# Patient Record
Sex: MALE
[De-identification: names, ages, dates, MRNs, and addresses within clinical notes are randomized per-mention and may not be internally consistent; named-entity substitution may affect disease eponyms.]

---

## 2017-12-28 ENCOUNTER — HOSPITAL ENCOUNTER (OUTPATIENT)
Dept: HOSPITAL 5 - OR | Age: 67
Discharge: HOME | End: 2017-12-28
Attending: OPHTHALMOLOGY
Payer: MEDICARE

## 2017-12-28 VITALS — SYSTOLIC BLOOD PRESSURE: 140 MMHG | DIASTOLIC BLOOD PRESSURE: 60 MMHG

## 2017-12-28 DIAGNOSIS — H11.001: Primary | ICD-10-CM

## 2017-12-28 PROCEDURE — V2790 AMNIOTIC MEMBRANE: HCPCS

## 2017-12-28 PROCEDURE — 88304 TISSUE EXAM BY PATHOLOGIST: CPT

## 2017-12-28 PROCEDURE — C9250 ARTISS FIBRIN SEALANT: HCPCS

## 2017-12-28 PROCEDURE — 65426 REMOVAL OF EYE LESION: CPT

## 2017-12-28 RX ADMIN — MOXIFLOXACIN SCH DROPS: 5 SOLUTION/ DROPS OPHTHALMIC at 12:35

## 2017-12-28 RX ADMIN — MOXIFLOXACIN SCH DROPS: 5 SOLUTION/ DROPS OPHTHALMIC at 12:42

## 2017-12-28 RX ADMIN — MOXIFLOXACIN SCH DROPS: 5 SOLUTION/ DROPS OPHTHALMIC at 12:29

## 2017-12-28 NOTE — OPERATIVE REPORT
Operative Report


Operative Report: 








PREOPERATIVE DIAGNOSIS: Pterygium with visual distortion, _right_eye





POSTOPERATIVE DIAGNOSIS: Pterygium with visual distortion, right eye





OPERATIVE PROCEDURE: Excision of pterygium with mitomycin C and amniotic graft  

membrane right eye





SURGEON: Anika Neal M.D.





ASSISTANT SURGEON: none





ANESTHESIA: Monitored anesthesia care





ANESTHETIST:





COMPLICATIONS: None





ALLERGIES: Chloroquine





PREOPERATIVE NOTE: The risks, benefits and alternatives of surgery were 

explained to the patient who after  confirmining understanding elected to 

proceed with surgery. The risks discussed included but were not limited to 

infection, further surgery, loss of vision, loss of the eye. The patient had 

multiple opportunities to ask questions and have them answered. Preoperative 

instruction sheet was provided and explained to the patient and/or family.





PROGNOSIS: Excellent





INDICATIONS FOR SURGERY: Distortion of vision from the lesion. Without treatment

, permanent visual loss is expected.





OPERATIVE REPORT: The patient was taken into the preoperative area and then 

sedated and monitored by Anesthesia. The patient was prepped by applying a 

Betadine scrub to the periorbital area, the adjacent cheek, and the forehead. 

The prepped areas were dried with sterile gauze. The patient was draped, and a 

speculum was placed between the eyelids.





2% lidocaine was injected below the body of the pterygium. A cut-down was made 

through the body of the pterygium to bare sclera. The dissection was then 

carried towards the limbus, elevating up the pterygium. Moderate bleeding was 

encountered and treated with cautery. Once the dissection was taken to the 

limbus, the head of the  pterygium was dissected off the cornea with a [place]oke 

knife. The pterygium was densely scarred into the underlying stroma, making the 

dissection process difficult to perform. A superficial dissection plane was 

made in a few areas.


The mass of fibrous growth was then excised from the limbus.





A sakina bur on a high-speed drill was used to smooth the area of the cornea 

where the pterygium was removed.





This was done in order to leave the tissue smooth and minimize the chance of 

recurrence. A rough limbal surface increases the risks of irritation, 

inflammation, and the possibility of postoperative recurrence in the eye. The 

limbal area was smoothed with the sakina bur, and care was taken not to remove 

too much tissue, leaving the cornea ectatic.





After the scar tissue was removed, Mitomycin-C was placed on bare sclera x 60 

secs on the eye with Weck-jany sponges and immediately irrigated off. The 

irrigation was done liberally to prevent any Mitomycin-C contamination to the 

rest of the field and the eye. The cornea was irrigated with balanced salt 

solution.





Once the pterygium was excised and the cornea smoothed, cautery was used to 

control any bleeding in the bed of bare sclera. The peripheral edges of 

remaining conjunctiva around the bare sclera had its edges undermined slightly 

to allow it to be fixed to the underlying sclera when the tissue adhesive would 

be applied. Calipers were then used to measure the width and length of the area 

of bare sclera. After marking the tips of the calipers, they were used to dawosn 

the amniotic graft. Scissors were next used to first undermine and then excise 

the graft. Fine-tooth forceps were used carefully to elevate the graft and 

moved to the area of the bare sclera. It was moved carefully to make sure that 

first of all the epithelial side remained upward . After the graft was found to 

be suitable for the area to be  covered, it was temporarily moved off the bare 

sclera and Tisseel tissue adhesive was applied in its two components as 

separate stages over the area of bare sclera. The graft was placed back in 

position and its edges were first pushed down 360 to allow firm fixation. The 

central area was also pushed down with firm pressure from a flat surfaced 

instrument. Next the edges of the previously undermined adjacent conjunctiva 

were pushed down to allow firm fixation. The flap was allowed to stay 

unmanipulated for ten minutes prior to removing the lid speculum.





MEDICATIONS APPLIED AT END OF SURGERY: bandage Contact lens was placed onto the 

eye fallowed by the application of Vigamox.





DISCHARGE SUMMARY: The patient was released in stable condition. The patient 

and those with the patient were given a written sheet of postoperative 

instructions and counseling on any abnormal laboratory studies. They are to 

call immediately for difficulties.





_________________________ __________________________

## 2017-12-28 NOTE — SHORT STAY SUMMARY
Short Stay Documentation


Date of service: 12/28/17





- History


H&P: obtained from office





- Allergies and Medications


Current Medications: 


 Allergies





No Known Allergies Allergy (Verified 12/28/17 10:57)


 





 Home Medications











 Medication  Instructions  Recorded  Confirmed  Last Taken  Type


 


Bisoprolol [Zebeta] 1 tab PO BID 12/27/17 12/28/17 12/28/17 06:30 History


 


Diltiazem HCl [Diltiazem HCl] 120 mg PO BID 12/27/17 12/28/17 12/28/17 06:30 

History


 


Doxazosin Mesylate [Cardura] 8 mg PO QHS 12/27/17 12/28/17 12/28/17 06:30 

History


 


Tamsulosin [Flomax] 0.4 mg PO QHS 12/27/17 12/28/17 12/28/17 06:30 History








Active Medications





Moxifloxacin HCl (Vigamox)  1 drops OD Q5MIN SARAY


   Stop: 12/28/17 23:59


   Last Admin: 12/28/17 12:42 Dose:  1 drops


Prednisolone Acetate (Pred Forte 1%)  1 drops OD QID SARAY


Tetracaine HCl (Tetracaine 0.5%)  1 drops OD Q5M PRN


   PRN Reason: Analgesia


   Stop: 12/28/17 23:59


   Last Admin: 12/28/17 12:28 Dose:  1 drops











- Brief post op/procedure progress note


Date of procedure: 12/28/17


Pre-op diagnosis: right pterygium


Post-op diagnosis: same


Procedure: 





Pterygium excision with mitomycin-C and amniotic graft membrane placement right 

eye


Anesthesia: MAC, local


Surgeon: SANDI MICHAEL


Estimated blood loss: minimal


Pathology: list (nasal pterygium)


Condition: stable





- Disposition


Condition at discharge: Good


Disposition: DC-01 TO HOME OR SELFCARE





- Discharge Diagnoses


(1) Pterygium eye


Status: Resolved   


Qualifiers: 


   Laterality: right   Qualified Code(s): H11.001 - Unspecified pterygium of 

right eye   





Short Stay Discharge Plan


Follow up with: 


ERIN WINTERS MD [Primary Care Provider] - 7 Days

## 2022-09-09 NOTE — POST ANESTHESIA EVALUATION
- Post Anesthesia Evaluation


Patient Participated: Yes


Airway Patent: Yes


Stable Respiratory Function: Yes


Temp > 96.8F: Yes


Pain Manageable: Yes


Adequeate Hydration: Yes


Anesthesia Complications: No
58

## 2023-07-27 ENCOUNTER — OFFICE VISIT (OUTPATIENT)
Dept: URBAN - METROPOLITAN AREA CLINIC 70 | Facility: CLINIC | Age: 73
End: 2023-07-27

## 2023-10-23 ENCOUNTER — TELEPHONE ENCOUNTER (OUTPATIENT)
Dept: URBAN - METROPOLITAN AREA CLINIC 118 | Facility: CLINIC | Age: 73
End: 2023-10-23

## 2023-10-23 ENCOUNTER — OFFICE VISIT (OUTPATIENT)
Dept: URBAN - METROPOLITAN AREA CLINIC 118 | Facility: CLINIC | Age: 73
End: 2023-10-23
Payer: MEDICARE

## 2023-10-23 VITALS
DIASTOLIC BLOOD PRESSURE: 88 MMHG | WEIGHT: 210 LBS | HEIGHT: 64 IN | BODY MASS INDEX: 35.85 KG/M2 | SYSTOLIC BLOOD PRESSURE: 129 MMHG | HEART RATE: 103 BPM | TEMPERATURE: 98.8 F

## 2023-10-23 DIAGNOSIS — Z87.19 HISTORY OF GASTRIC POLYP: ICD-10-CM

## 2023-10-23 DIAGNOSIS — Z86.010 HISTORY OF COLON POLYPS: ICD-10-CM

## 2023-10-23 PROBLEM — 266997008: Status: ACTIVE | Noted: 2023-10-23

## 2023-10-23 PROBLEM — 428283002: Status: ACTIVE | Noted: 2023-10-23

## 2023-10-23 PROCEDURE — 99204 OFFICE O/P NEW MOD 45 MIN: CPT | Performed by: STUDENT IN AN ORGANIZED HEALTH CARE EDUCATION/TRAINING PROGRAM

## 2023-10-23 RX ORDER — TAMSULOSIN HYDROCHLORIDE 0.4 MG/1
TAKE 1 CAPSULE BY MOUTH TWICE DAILY CAPSULE ORAL
Qty: 180 EACH | Refills: 1 | Status: ACTIVE | COMMUNITY

## 2023-10-23 RX ORDER — METOPROLOL SUCCINATE 100 MG/1
TABLET, EXTENDED RELEASE ORAL
Qty: 90 TABLET | Status: ACTIVE | COMMUNITY

## 2023-10-23 RX ORDER — CLONIDINE HYDROCHLORIDE 0.1 MG/1
TABLET ORAL
Qty: 60 TABLET | Status: ACTIVE | COMMUNITY

## 2023-10-23 RX ORDER — AMLODIPINE BESYLATE 10 MG/1
TABLET ORAL
Qty: 90 TABLET | Status: ACTIVE | COMMUNITY

## 2023-10-23 RX ORDER — LISINOPRIL 20 MG/1
TAKE 1 TABLET BY MOUTH ONCE DAILY TABLET ORAL
Qty: 90 EACH | Refills: 1 | Status: ACTIVE | COMMUNITY

## 2023-10-23 RX ORDER — APIXABAN 5 MG/1
TAKE 1 TABLET BY MOUTH TWICE DAILY TABLET, FILM COATED ORAL
Qty: 60 EACH | Refills: 0 | Status: ACTIVE | COMMUNITY

## 2023-10-23 RX ORDER — IRON FUM,PS CMP/VIT C/NIACIN 125-40-3MG
TAKE 1 CAPSULE BY MOUTH ONCE DAILY CAPSULE ORAL
Qty: 30 EACH | Refills: 3 | Status: ACTIVE | COMMUNITY

## 2023-10-23 RX ORDER — LISINOPRIL 10 MG/1
TABLET ORAL
Qty: 90 TABLET | Status: ACTIVE | COMMUNITY

## 2023-10-23 RX ORDER — ACETAMINOPHEN 500 MG/1
TAKE 1 TABLET BY MOUTH EVERY 4 TO 6 HOURS TABLET ORAL
Qty: 90 EACH | Refills: 0 | Status: ACTIVE | COMMUNITY

## 2023-10-23 RX ORDER — ASPIRIN 81 MG/1
TAKE 1 TABLET BY MOUTH IN THE MORNING WITH FOOD TABLET, COATED ORAL
Qty: 30 EACH | Refills: 1 | Status: ACTIVE | COMMUNITY

## 2023-10-23 NOTE — HPI-TODAY'S VISIT:
The patient is a 73-year-old male with CHF, aortic stenosis, status post recent bioprosthetic valve placement, and CKD, on Eliquis, who presents regarding the need for endoscopy.  The patient is not entirely sure why he is here today.  He denies abdominal pain, nausea, vomiting, weight loss, constipation, diarrhea, or blood in the stool.  His wife has scanned copies from recent records which show that he had a endoscopy at [facility] in March 2021, and again at [facility] in April 2021.  Full records are not available for review.  Limited images show removal of a polyp from the stomach (patient believes he may have had a polyp removed from the colon as well).  The patient believes he may be due for surveillance endoscopy at this time, but is not sure if he is due for upper or lower surveillance.  Pathology is not available from either endoscopy.  Of note, the patient was recently started on Eliquis (within the past week).

## 2024-01-12 ENCOUNTER — OFFICE VISIT (OUTPATIENT)
Dept: URBAN - METROPOLITAN AREA CLINIC 118 | Facility: CLINIC | Age: 74
End: 2024-01-12
Payer: MEDICARE

## 2024-01-12 ENCOUNTER — LAB OUTSIDE AN ENCOUNTER (OUTPATIENT)
Dept: URBAN - METROPOLITAN AREA CLINIC 118 | Facility: CLINIC | Age: 74
End: 2024-01-12

## 2024-01-12 ENCOUNTER — WEB ENCOUNTER (OUTPATIENT)
Dept: URBAN - METROPOLITAN AREA CLINIC 6 | Facility: CLINIC | Age: 74
End: 2024-01-12

## 2024-01-12 VITALS
SYSTOLIC BLOOD PRESSURE: 179 MMHG | WEIGHT: 214 LBS | HEIGHT: 64 IN | TEMPERATURE: 98 F | BODY MASS INDEX: 36.54 KG/M2 | DIASTOLIC BLOOD PRESSURE: 77 MMHG | HEART RATE: 44 BPM

## 2024-01-12 DIAGNOSIS — K31.7 GASTRIC POLYP: ICD-10-CM

## 2024-01-12 DIAGNOSIS — Z12.11 COLON CANCER SCREENING: ICD-10-CM

## 2024-01-12 PROBLEM — 78809005: Status: ACTIVE | Noted: 2024-01-12

## 2024-01-12 PROCEDURE — 99204 OFFICE O/P NEW MOD 45 MIN: CPT | Performed by: STUDENT IN AN ORGANIZED HEALTH CARE EDUCATION/TRAINING PROGRAM

## 2024-01-12 RX ORDER — TAMSULOSIN HYDROCHLORIDE 0.4 MG/1
TAKE 1 CAPSULE BY MOUTH TWICE DAILY CAPSULE ORAL
Qty: 180 EACH | Refills: 1 | Status: ACTIVE | COMMUNITY

## 2024-01-12 RX ORDER — CLONIDINE HYDROCHLORIDE 0.1 MG/1
TABLET ORAL
Qty: 60 TABLET | Status: ACTIVE | COMMUNITY

## 2024-01-12 RX ORDER — IRON FUM,PS CMP/VIT C/NIACIN 125-40-3MG
TAKE 1 CAPSULE BY MOUTH ONCE DAILY CAPSULE ORAL
Qty: 30 EACH | Refills: 3 | Status: ACTIVE | COMMUNITY

## 2024-01-12 RX ORDER — METOPROLOL SUCCINATE 100 MG/1
TABLET, EXTENDED RELEASE ORAL
Qty: 90 TABLET | Status: ACTIVE | COMMUNITY

## 2024-01-12 RX ORDER — ACETAMINOPHEN 500 MG/1
TAKE 1 TABLET BY MOUTH EVERY 4 TO 6 HOURS TABLET ORAL
Qty: 90 EACH | Refills: 0 | Status: ACTIVE | COMMUNITY

## 2024-01-12 RX ORDER — AMLODIPINE BESYLATE 10 MG/1
TABLET ORAL
Qty: 90 TABLET | Status: ACTIVE | COMMUNITY

## 2024-01-12 RX ORDER — ASPIRIN 81 MG/1
TAKE 1 TABLET BY MOUTH IN THE MORNING WITH FOOD TABLET, COATED ORAL
Qty: 30 EACH | Refills: 1 | Status: ACTIVE | COMMUNITY

## 2024-01-12 RX ORDER — APIXABAN 5 MG/1
TAKE 1 TABLET BY MOUTH TWICE DAILY TABLET, FILM COATED ORAL
Qty: 60 EACH | Refills: 0 | Status: ACTIVE | COMMUNITY

## 2024-01-12 RX ORDER — POLYETHYLENE GLYCOL 3350, SODIUM SULFATE ANHYDROUS, SODIUM BICARBONATE, SODIUM CHLORIDE, POTASSIUM CHLORIDE 236; 22.74; 6.74; 5.86; 2.97 G/4L; G/4L; G/4L; G/4L; G/4L
AS DIRECTED POWDER, FOR SOLUTION ORAL
Qty: 1 | Refills: 0 | OUTPATIENT
Start: 2024-01-13 | End: 2024-01-15

## 2024-01-12 RX ORDER — LISINOPRIL 20 MG/1
TAKE 1 TABLET BY MOUTH ONCE DAILY TABLET ORAL
Qty: 90 EACH | Refills: 1 | Status: ACTIVE | COMMUNITY

## 2024-01-12 RX ORDER — LISINOPRIL 10 MG/1
TABLET ORAL
Qty: 90 TABLET | Status: ACTIVE | COMMUNITY

## 2024-01-12 NOTE — HPI-TODAY'S VISIT:
The patient is a 73-year-old male with CHF, aortic stenosis, s/p recent bioprosthetic valve placement, and CKD, on Eliquis, who presents for follow up regarding the need for endoscopy. The patient was last seen in clinic on 10/23/23. At this visit, records were requested from the patient's recent endoscopies.  Colonoscopy (3/24/2021) performed for iron deficiency anemia revealed a normal colon, moderate descending and sigmoid diverticulosis, and internal hemorrhoids; the exam was limited by poor prep and surveillance in 1 year with a 2-day bowel prep was recommended.  EGD (4/20/2021) was performed for EMR of a gastric mass (hyperplastic on pathology).  The patient continues on Eliquis and denies noticeable blood loss.

## 2024-01-24 PROBLEM — 305058001: Status: ACTIVE | Noted: 2024-01-24

## 2024-03-18 ENCOUNTER — COL/EGD (OUTPATIENT)
Dept: URBAN - METROPOLITAN AREA MEDICAL CENTER 28 | Facility: MEDICAL CENTER | Age: 74
End: 2024-03-18

## 2024-03-18 RX ORDER — IRON FUM,PS CMP/VIT C/NIACIN 125-40-3MG
TAKE 1 CAPSULE BY MOUTH ONCE DAILY CAPSULE ORAL
Qty: 30 EACH | Refills: 3 | Status: ACTIVE | COMMUNITY

## 2024-03-18 RX ORDER — CLONIDINE HYDROCHLORIDE 0.1 MG/1
TABLET ORAL
Qty: 60 TABLET | Status: ACTIVE | COMMUNITY

## 2024-03-18 RX ORDER — LISINOPRIL 10 MG/1
TABLET ORAL
Qty: 90 TABLET | Status: ACTIVE | COMMUNITY

## 2024-03-18 RX ORDER — TAMSULOSIN HYDROCHLORIDE 0.4 MG/1
TAKE 1 CAPSULE BY MOUTH TWICE DAILY CAPSULE ORAL
Qty: 180 EACH | Refills: 1 | Status: ACTIVE | COMMUNITY

## 2024-03-18 RX ORDER — AMLODIPINE BESYLATE 10 MG/1
TABLET ORAL
Qty: 90 TABLET | Status: ACTIVE | COMMUNITY

## 2024-03-18 RX ORDER — ACETAMINOPHEN 500 MG/1
TAKE 1 TABLET BY MOUTH EVERY 4 TO 6 HOURS TABLET ORAL
Qty: 90 EACH | Refills: 0 | Status: ACTIVE | COMMUNITY

## 2024-03-18 RX ORDER — METOPROLOL SUCCINATE 100 MG/1
TABLET, EXTENDED RELEASE ORAL
Qty: 90 TABLET | Status: ACTIVE | COMMUNITY

## 2024-03-18 RX ORDER — ASPIRIN 81 MG/1
TAKE 1 TABLET BY MOUTH IN THE MORNING WITH FOOD TABLET, COATED ORAL
Qty: 30 EACH | Refills: 1 | Status: ACTIVE | COMMUNITY

## 2024-03-18 RX ORDER — LISINOPRIL 20 MG/1
TAKE 1 TABLET BY MOUTH ONCE DAILY TABLET ORAL
Qty: 90 EACH | Refills: 1 | Status: ACTIVE | COMMUNITY

## 2024-03-18 RX ORDER — APIXABAN 5 MG/1
TAKE 1 TABLET BY MOUTH TWICE DAILY TABLET, FILM COATED ORAL
Qty: 60 EACH | Refills: 0 | Status: ACTIVE | COMMUNITY

## 2024-05-10 ENCOUNTER — DASHBOARD ENCOUNTERS (OUTPATIENT)
Age: 74
End: 2024-05-10

## 2024-05-10 ENCOUNTER — OFFICE VISIT (OUTPATIENT)
Dept: URBAN - METROPOLITAN AREA CLINIC 118 | Facility: CLINIC | Age: 74
End: 2024-05-10
Payer: MEDICARE

## 2024-05-10 VITALS
BODY MASS INDEX: 36.02 KG/M2 | WEIGHT: 211 LBS | HEIGHT: 64 IN | TEMPERATURE: 97.9 F | SYSTOLIC BLOOD PRESSURE: 139 MMHG | DIASTOLIC BLOOD PRESSURE: 65 MMHG | HEART RATE: 69 BPM

## 2024-05-10 DIAGNOSIS — D50.9 IRON DEFICIENCY ANEMIA, UNSPECIFIED IRON DEFICIENCY ANEMIA TYPE: ICD-10-CM

## 2024-05-10 DIAGNOSIS — Z87.19 HISTORY OF GASTRIC POLYP: ICD-10-CM

## 2024-05-10 DIAGNOSIS — Z86.010 HISTORY OF COLON POLYPS: ICD-10-CM

## 2024-05-10 PROBLEM — 87522002: Status: ACTIVE | Noted: 2024-05-10

## 2024-05-10 PROCEDURE — 99205 OFFICE O/P NEW HI 60 MIN: CPT | Performed by: STUDENT IN AN ORGANIZED HEALTH CARE EDUCATION/TRAINING PROGRAM

## 2024-05-10 RX ORDER — APIXABAN 5 MG/1
TAKE 1 TABLET BY MOUTH TWICE DAILY TABLET, FILM COATED ORAL
Qty: 60 EACH | Refills: 0 | Status: ACTIVE | COMMUNITY

## 2024-05-10 RX ORDER — IRON FUM,PS CMP/VIT C/NIACIN 125-40-3MG
TAKE 1 CAPSULE BY MOUTH ONCE DAILY CAPSULE ORAL
Qty: 30 EACH | Refills: 3 | Status: ACTIVE | COMMUNITY

## 2024-05-10 RX ORDER — ACETAMINOPHEN 500 MG/1
TAKE 1 TABLET BY MOUTH EVERY 4 TO 6 HOURS TABLET ORAL
Qty: 90 EACH | Refills: 0 | Status: ACTIVE | COMMUNITY

## 2024-05-10 RX ORDER — ASPIRIN 81 MG/1
TAKE 1 TABLET BY MOUTH IN THE MORNING WITH FOOD TABLET, COATED ORAL
Qty: 30 EACH | Refills: 1 | Status: ACTIVE | COMMUNITY

## 2024-05-10 RX ORDER — METOPROLOL SUCCINATE 100 MG/1
TABLET, EXTENDED RELEASE ORAL
Qty: 90 TABLET | Status: ACTIVE | COMMUNITY

## 2024-05-10 RX ORDER — AMLODIPINE BESYLATE 10 MG/1
TABLET ORAL
Qty: 90 TABLET | Status: ACTIVE | COMMUNITY

## 2024-05-10 RX ORDER — TAMSULOSIN HYDROCHLORIDE 0.4 MG/1
TAKE 1 CAPSULE BY MOUTH TWICE DAILY CAPSULE ORAL
Qty: 180 EACH | Refills: 1 | Status: ACTIVE | COMMUNITY

## 2024-05-10 RX ORDER — LISINOPRIL 20 MG/1
TAKE 1 TABLET BY MOUTH ONCE DAILY TABLET ORAL
Qty: 90 EACH | Refills: 1 | Status: ACTIVE | COMMUNITY

## 2024-05-10 RX ORDER — CLONIDINE HYDROCHLORIDE 0.1 MG/1
TABLET ORAL
Qty: 60 TABLET | Status: ACTIVE | COMMUNITY

## 2024-05-10 RX ORDER — LISINOPRIL 10 MG/1
TABLET ORAL
Qty: 90 TABLET | Status: ACTIVE | COMMUNITY

## 2024-05-11 LAB
FERRITIN, SERUM: 74
IRON BIND.CAP.(TIBC): 215
IRON SATURATION: 52
IRON: 112